# Patient Record
Sex: MALE | Race: WHITE | NOT HISPANIC OR LATINO | Employment: OTHER | URBAN - NONMETROPOLITAN AREA
[De-identification: names, ages, dates, MRNs, and addresses within clinical notes are randomized per-mention and may not be internally consistent; named-entity substitution may affect disease eponyms.]

---

## 2022-10-13 ENCOUNTER — OFFICE VISIT (OUTPATIENT)
Dept: FAMILY MEDICINE | Facility: CLINIC | Age: 47
End: 2022-10-13

## 2022-10-13 VITALS
HEIGHT: 73 IN | SYSTOLIC BLOOD PRESSURE: 100 MMHG | TEMPERATURE: 101 F | HEART RATE: 106 BPM | DIASTOLIC BLOOD PRESSURE: 80 MMHG | WEIGHT: 141 LBS | BODY MASS INDEX: 18.69 KG/M2 | OXYGEN SATURATION: 98 % | RESPIRATION RATE: 17 BRPM

## 2022-10-13 DIAGNOSIS — R05.8 COUGH WITH EXPOSURE TO COVID-19 VIRUS: ICD-10-CM

## 2022-10-13 DIAGNOSIS — Z20.822 COUGH WITH EXPOSURE TO COVID-19 VIRUS: ICD-10-CM

## 2022-10-13 DIAGNOSIS — U07.1 COVID-19: Primary | ICD-10-CM

## 2022-10-13 LAB
CTP QC/QA: YES
FLUAV AG NPH QL: NEGATIVE
FLUBV AG NPH QL: NEGATIVE
SARS-COV-2 AG RESP QL IA.RAPID: POSITIVE

## 2022-10-13 PROCEDURE — 99203 OFFICE O/P NEW LOW 30 MIN: CPT | Mod: ,,, | Performed by: NURSE PRACTITIONER

## 2022-10-13 PROCEDURE — 87428 SARSCOV & INF VIR A&B AG IA: CPT | Mod: QW,,, | Performed by: NURSE PRACTITIONER

## 2022-10-13 PROCEDURE — 87428 POCT SARS-COV2 (COVID) WITH FLU ANTIGEN: ICD-10-PCS | Mod: QW,,, | Performed by: NURSE PRACTITIONER

## 2022-10-13 PROCEDURE — 99203 PR OFFICE/OUTPT VISIT, NEW, LEVL III, 30-44 MIN: ICD-10-PCS | Mod: ,,, | Performed by: NURSE PRACTITIONER

## 2022-10-13 RX ORDER — GUAIFENESIN 600 MG/1
1200 TABLET, EXTENDED RELEASE ORAL 2 TIMES DAILY
Qty: 40 TABLET | Refills: 0 | Status: SHIPPED | OUTPATIENT
Start: 2022-10-13 | End: 2022-10-23

## 2022-10-13 NOTE — LETTER
October 13, 2022      [facility]  [address] [place] [address]  Phone: [phone]  Fax: [phone]       Patient: Nigel Hudson   YOB: 1975  Date of Visit: 10/13/2022    To Whom It May Concern:    Barry Hudson  was at [facility] on 10/13/2022. The patient may return to work/school on 10/17/2022 with no restrictions. If you have any questions or concerns, or if I can be of further assistance, please do not hesitate to contact me.    Sincerely,    MIKIE Patricio

## 2022-10-13 NOTE — PROGRESS NOTES
"   [facility]  Phone: [phone]       PATIENT NAME: Nigel Hudson   : 1975    AGE: 46 y.o. DATE: 10/13/2022    MRN: [id number]        Reason for Visit / Chief Complaint:  Fever, Headache, and Generalized Body Aches (Patient is here today for a fever, headache and body aches. Patient stated that his symptoms started on Monday. Patient stated that his fever has been running 100.6 -101  for the past three days. )     Subjective:     HPI:  46 yr old WM presents to the office for fever, Ha, body aches since Monday     Review of Systems:    Pertinent items are above noted in HPI.    Review of patient's allergies indicates:   Allergen Reactions    Codeine         Med List:  No current outpatient medications on file prior to visit.     No current facility-administered medications on file prior to visit.       Medical/Social/Family History:  History reviewed. No pertinent past medical history.   Social History     Tobacco Use   Smoking Status Never   Smokeless Tobacco Never      Social History     Substance and Sexual Activity   Alcohol Use Not Currently       History reviewed. No pertinent family history.   History reviewed. No pertinent surgical history.     Objective:      Vitals:    10/13/22 1021   BP: 100/80   BP Location: Right arm   Patient Position: Sitting   Pulse: 106   Resp: 17   Temp: (!) 100.5 °F (38.1 °C)   SpO2: 98%   Weight: 64 kg (141 lb)   Height: 6' 1" (1.854 m)     Body mass index is 18.6 kg/m².     Physical Exam:    General: well developed, well nourished    Head: normocephalic, atraumatic    Eyes: conjunctivae/corneas clear. PERRL.    Mouth/ENT: postnasal drip notedmucous membranes moist, pharynx normal without lesions Uvula is midline.     Neck: supple, symmetrical, trachea midline, no adenopathy, and thyroid: not enlarged, symmetric, no tenderness/mass/nodules    Respiratory: unlabored respirations, no intercostal retractions or " accessory muscle use, clear to auscultation without rales or wheezes    Cardiovascular: normal rate and regular rhythm, S1 and S2 normal, no murmurs noted    Abdomen: soft, nontender    Musculoskeletal: negative; full range of motion, no tenderness, palpable spasm or pain on motion    Lymphadenopathy: No cervical or supraclavicular adenopathy    Neurological: normal without focal findings and mental status, speech normal, alert and oriented x3    Skin: Skin color, texture, turgor normal. No rashes or lesions    Psych: no auditory or visual hallucinations, no anxiety, no depression/worrying alot       Assessment:          ICD-10-CM ICD-9-CM   1. COVID-19  U07.1 079.89   2. Cough with exposure to COVID-19 virus  R05.8 786.2    Z20.822 V01.79        Plan:       COVID-19  -     pyrilamine-chlophedianoL 12.5-12.5 mg/5 mL Liqd; Take 10 mLs by mouth every 8 (eight) hours as needed (cough).  Dispense: 240 mL; Refill: 0  -     guaiFENesin (MUCINEX) 600 mg 12 hr tablet; Take 2 tablets (1,200 mg total) by mouth 2 (two) times daily. for 10 days  Dispense: 40 tablet; Refill: 0    Cough with exposure to COVID-19 virus  -     POCT SARS-COV2 (COVID) with Flu Antigen      Current Outpatient Medications:     guaiFENesin (MUCINEX) 600 mg 12 hr tablet, Take 2 tablets (1,200 mg total) by mouth 2 (two) times daily. for 10 days, Disp: 40 tablet, Rfl: 0    pyrilamine-chlophedianoL 12.5-12.5 mg/5 mL Liqd, Take 10 mLs by mouth every 8 (eight) hours as needed (cough)., Disp: 240 mL, Rfl: 0      New & refilled meds:  Requested Prescriptions     Signed Prescriptions Disp Refills    pyrilamine-chlophedianoL 12.5-12.5 mg/5 mL Liqd 240 mL 0     Sig: Take 10 mLs by mouth every 8 (eight) hours as needed (cough).    guaiFENesin (MUCINEX) 600 mg 12 hr tablet 40 tablet 0     Sig: Take 2 tablets (1,200 mg total) by mouth 2 (two) times daily. for 10 days     Treatment Recommendations:    Orders and follow up as documented in patient record.  Rest,  hydrate, tylenol otc prn, quarantine x 5 days  Return to clinic as needed.    Signature: KARINA Gruber    Agree with plan